# Patient Record
Sex: MALE | Race: BLACK OR AFRICAN AMERICAN | Employment: FULL TIME | URBAN - METROPOLITAN AREA
[De-identification: names, ages, dates, MRNs, and addresses within clinical notes are randomized per-mention and may not be internally consistent; named-entity substitution may affect disease eponyms.]

---

## 2017-06-30 ENCOUNTER — HOSPITAL ENCOUNTER (OUTPATIENT)
Dept: GENERAL RADIOLOGY | Age: 62
Discharge: HOME | End: 2017-06-30
Payer: COMMERCIAL

## 2017-06-30 DIAGNOSIS — R20.0 NUMBNESS AND TINGLING OF HAND: ICD-10-CM

## 2017-06-30 DIAGNOSIS — R20.2 NUMBNESS AND TINGLING OF HAND: ICD-10-CM

## 2017-06-30 PROCEDURE — 72040 X-RAY EXAM NECK SPINE 2-3 VW: CPT

## 2017-08-17 ENCOUNTER — HOSPITAL ENCOUNTER (OUTPATIENT)
Dept: PHYSICAL THERAPY | Age: 62
Discharge: HOME | End: 2017-08-17
Payer: COMMERCIAL

## 2017-08-17 PROCEDURE — 97110 THERAPEUTIC EXERCISES: CPT

## 2017-08-17 PROCEDURE — 97140 MANUAL THERAPY 1/> REGIONS: CPT

## 2017-08-17 PROCEDURE — 97161 PT EVAL LOW COMPLEX 20 MIN: CPT

## 2017-08-17 NOTE — PROGRESS NOTES
PT DAILY TREATMENT NOTE     Patient Name: Alex Grimes  Date:2017  : 1955  [x]  Patient  Verified  Payor: Ava Granado / Plan: [employer] / Product Type: [employer] /    In time:900  Out time:943  Total Treatment Time (min): 43  Visit #: 1 of     Treatment Area: Neck pain [M54.2]    SUBJECTIVE  Pain Level (0-10 scale): 0/10  Any medication changes, allergies to medications, adverse drug reactions, diagnosis change, or new procedure performed?: [x] No    [] Yes (see summary sheet for update)  Subjective functional status/changes:   [] No changes reported  See eval    OBJECTIVE    25 min []Eval                  []Re-Eval       8 min Therapeutic Exercise:  [] See flow sheet :   Rationale: increase ROM and increase strength to improve the patients ability to ease with ADL's    10 min Manual Therapy:  SOR   Rationale: decrease pain, increase ROM and decrease trigger points to ease with ADL's        With   [] TE   [] TA   [] neuro   [] other: Patient Education: [x] Review HEP    [] Progressed/Changed HEP based on:   [] positioning   [] body mechanics   [] transfers   [] heat/ice application    [] other:      Other Objective/Functional Measures: see eval     Pain Level (0-10 scale) post treatment: 0/10    ASSESSMENT/Changes in Function: see poc    Patient will continue to benefit from skilled PT services to address ROM deficits, address strength deficits, analyze and address soft tissue restrictions, analyze and cue movement patterns and analyze and modify body mechanics/ergonomics to attain remaining goals.      [x]  See Plan of Care  []  See progress note/recertification  []  See Discharge Summary         Progress towards goals / Updated goals:  See POc    PLAN  [x]  Upgrade activities as tolerated     [x]  Continue plan of care  []  Update interventions per flow sheet       []  Discharge due to:_  []  Other:_      Jacky Rojas, PT 2017  2:29 PM    Future Appointments  Date Time Provider Quang Lacey   8/22/2017 10:30 AM Fidelia Toney, PTA [id number] [facility]   8/24/2017 10:30 AM Fidelia Toney, PTA [id number] [facility]   8/29/2017 10:30 AM Fidelia Toney, PTA [id number] [facility]   8/31/2017 11:00 AM Gabi Burgess, PT [id number] [facility]   9/5/2017 9:00 AM Govea Smoker, PT [id number] [facility]   9/7/2017 10:30 AM Fidelia Toney, PTA [id number] [facility]   9/12/2017 11:00 AM Govea Smoker, PT [id number] [facility]   9/14/2017 10:30 AM Gabi Burgess, PT [id number] [facility]

## 2017-08-17 NOTE — PROGRESS NOTES
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 The Plan of Care and following information is based on the information from the initial evaluation. Assessment/ key information: Pt is a 59 yr old male with complaints of numbness to all digits distally to BUE. He reports it began 1 month ago after trimming hedges. The sx of numbness are constant and increases with C/S extension. C6-C7 MMT is grossly 4/5. C/S ROM is WNL and he denies any pain. There is some UT tightness and Cervical flexor weakness. UE ROM is WNL in all planes. C/S distraction test is positive. Sx did not decrease in supine position with manual traction today. Pt was requested to hold off on aggravating factors such as bike riding in forward flexred with C/S extension position. Pt will benefit from trial of PT to decrease sx of numbness and to improve his level of function.     Evaluation Complexity History MEDIUM  Complexity : 1-2 comorbidities / personal factors will impact the outcome/ POC ; Examination LOW Complexity : 1-2 Standardized tests and measures addressing body structure, function, activity limitation and / or participation in recreation  ;Presentation LOW Complexity : Stable, uncomplicated  ;Clinical Decision Making MEDIUM Complexity : FOTO score of 26-74  Overall Complexity Rating: LOW   Problem List: decrease ADL/ functional abilitiies, decrease activity tolerance and decrease flexibility/ joint mobility   Treatment Plan may include any combination of the following: Therapeutic exercise, Therapeutic activities, Neuromuscular re-education, Physical agent/modality, Manual therapy, Patient education and Self Care training  Patient / Family readiness to learn indicated by: asking questions, trying to perform skills and interest  Persons(s) to be included in education: patient (P)  Barriers to Learning/Limitations: None  Patient Goal (s): Relief of Paresthesias  Patient Self Reported Health Status: excellent  Rehabilitation Potential: good    Short Term Goals: To be accomplished in 1 weeks:   1. PT will be compliant with HEP to decrease sx of paresthesias. Long Term Goals: To be accomplished in 5 weeks:   1. Pt will increase FOTO score by 7 pts to ease with ADL's.   2. 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 3. Pt will demonstrate improvement and continue to be pain free during job and ADL's to improve QOL and return to PLOF. Frequency / Duration: Patient to be seen 2 times per week for 6 weeks. Patient/ CarPatient/ Caregiver education and instruction: Diagnosis, prognosis, exercises   [x]  Plan of care has been reviewed with PTA    Asha Goldberg, PT 8/17/2017 2:32 PM    ________________________________________________________________________    I certify that the above Therapy Services are being furnished while the patient is under my care. I agree with the treatment plan and certify that this therapy is necessary.     Physician's Signature:____________________  Date:____________Time: _________    Please sign and return to [facility]  [address] [facility]  [phone] fax

## 2017-08-22 ENCOUNTER — HOSPITAL ENCOUNTER (OUTPATIENT)
Dept: PHYSICAL THERAPY | Age: 62
Discharge: HOME | End: 2017-08-22
Payer: COMMERCIAL

## 2017-08-22 PROCEDURE — 97110 THERAPEUTIC EXERCISES: CPT

## 2017-08-22 PROCEDURE — 97140 MANUAL THERAPY 1/> REGIONS: CPT

## 2017-08-22 NOTE — PROGRESS NOTES
PT DAILY TREATMENT NOTE     Patient Name: Mauro Lovell  Date:2017  : 1955  [x]  Patient  Verified  Payor: [employer] / Plan: [employer] / Product Type: [employer] /    In time: 10:30  Out time:11:15  Total Treatment Time (min): 45  Visit #: 2 of -12    Treatment Area: Neck pain [M54.2]    SUBJECTIVE  Pain Level (0-10 scale): 0/10  Any medication changes, allergies to medications, adverse drug reactions, diagnosis change, or new procedure performed?: [x] No    [] Yes (see summary sheet for update)  Subjective functional status/changes:   [] No changes reported  Pt reported feeling little relief, still have min numbness in his hands and fingers    OBJECTIVE    Modality rationale: decrease pain, increase tissue extensibility and increase muscle contraction/control to improve the patients ability to tolerate ADLs   Min Type Additional Details    [] Estim:  []Unatt       []IFC  []Premod                        []Other:  []w/ice   []w/heat  Position:  Location:    [] Estim: []Att    []TENS instruct  []NMES                    []Other:  []w/US   []w/ice   []w/heat  Position:  Location:    []  Traction: [] Cervical       []Lumbar                       [] Prone          []Supine                       []Intermittent   []Continuous Lbs:  [] before manual  [] after manual    []  Ultrasound: []Continuous   [] Pulsed                           []1MHz   []3MHz W/cm2:  Location:    []  Iontophoresis with dexamethasone         Location: [] Take home patch   [] In clinic   10 []  Ice     [x]  heat  []  Ice massage  []  Laser   []  Anodyne Position:seated Location: c/s    []  Laser with stim  []  Other:  Position:  Location:    []  Vasopneumatic Device Pressure:       [] lo [] med [] hi   Temperature: [] lo [] med [] hi   [x] Skin assessment post-treatment:  [x]intact []redness- no adverse reaction    []redness - adverse reaction:         27 min Therapeutic Exercise:  [x] See flow sheet :   Rationale: increase ROM and increase strength to improve the patients ability to ease with ADL's    8 min Manual Therapy:  SOR & B UT DTM/TPR   Rationale: decrease pain, increase ROM, increase tissue extensibility and decrease trigger points to perform ADLs with ease          With   [] TE   [] TA   [] neuro   [] other: Patient Education: [x] Review HEP    [] Progressed/Changed HEP based on:   [] positioning   [] body mechanics   [] transfers   [] heat/ice application    [] other:      Other Objective/Functional Measures:    Performed all Domi with no numbness or pain   Mod hypertonicity of suboccipital muscles and B UT      Pain Level (0-10 scale) post treatment: 0/10    ASSESSMENT/Changes in Function: initiated treatment as POC, didn't show sign/symptoms during PT session. Will edu him on tennis ball and theracane use to assist with trigger points/muscle tightness relief for self management in long term. Patient will continue to benefit from skilled PT services to modify and progress therapeutic interventions, address functional mobility deficits, address strength deficits, analyze and address soft tissue restrictions, analyze and cue movement patterns, analyze and modify body mechanics/ergonomics, assess and modify postural abnormalities and instruct in home and community integration to attain remaining goals. [x]  See Plan of Care  []  See progress note/recertification  []  See Discharge Summary         Progress towards goals / Updated goals:  Short Term Goals: To be accomplished in 1 weeks:                         1. PT will be compliant with HEP to decrease sx of paresthesias. Partially met due to pt's schedule 8-22-17  Long Term Goals: To be accomplished in 5 weeks:                         1. Pt will increase FOTO score by 7 pts to ease with ADL's.                         2. 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                          3. Pt will demonstrate improvement and continue to be pain free during job and ADL's to improve QOL and return to OF.     PLAN   []  Upgrade activities as tolerated     [x]  Continue plan of care  []  Update interventions per flow sheet       []  Discharge due to:_  []  Other:_      Danay Lopez, PT 8/22/2017  9:12 AM    Future Appointments  Date Time Provider Quang Lacey   8/22/2017 10:30 AM Nadiya Escobar [id number] [facility]   8/24/2017 10:30 AM Miroslava Carmona PTA [id number] [facility]   8/29/2017 10:30 AM Miroslava Carmona, BRIDGET MMCPTPB [facility]   8/31/2017 11:00 AM Jacqualine Schaumann, PT SOWREAJ [facility]   9/5/2017 9:00 AM Mell Rowland, PT [id number] [facility]   9/7/2017 10:30 AM Miroslava Carmona, PTA [id number] [facility]   9/12/2017 11:00 AM Mell Rowland, PT [id number] [facility]   9/14/2017 10:30 AM Jacqualine Schaumann, PT [id number] [facility]

## 2017-08-24 ENCOUNTER — HOSPITAL ENCOUNTER (OUTPATIENT)
Dept: PHYSICAL THERAPY | Age: 62
Discharge: HOME | End: 2017-08-24
Payer: COMMERCIAL

## 2017-08-24 PROCEDURE — 97140 MANUAL THERAPY 1/> REGIONS: CPT

## 2017-08-24 PROCEDURE — 97110 THERAPEUTIC EXERCISES: CPT

## 2017-08-24 NOTE — PROGRESS NOTES
PT DAILY TREATMENT NOTE     Patient Name: Rose Taveras  Date:2017  : 1955  [x]  Patient  Verified  Payor: [employer] / Plan: Glenn Lazo / Product Type: Local [employer] /    In time:1034  Out time:1115  Total Treatment Time (min): 41  Visit #: 3 of 8-10    Treatment Area: Neck pain [M54.2]    SUBJECTIVE  Pain Level (0-10 scale): 0/10  Any medication changes, allergies to medications, adverse drug reactions, diagnosis change, or new procedure performed?: [x] No    [] Yes (see summary sheet for update)  Subjective functional status/changes:   [] No changes reported  Reports  He had 10% relief after last session and as soon as he got on the computer at work the sx came back fully. He said he is back to 10% less symptomatic.     OBJECTIVE    Modality rationale: decrease pain to improve the patients ability to ease with ADL's   Min Type Additional Details    [] Estim:  []Unatt       []IFC  []Premod                        []Other:  []w/ice   []w/heat  Position:  Location:    [] Estim: []Att    []TENS instruct  []NMES                    []Other:  []w/US   []w/ice   []w/heat  Position:  Location:    []  Traction: [] Cervical       []Lumbar                       [] Prone          []Supine                       []Intermittent   []Continuous Lbs:  [] before manual  [] after manual    []  Ultrasound: []Continuous   [] Pulsed                           []1MHz   []3MHz W/cm2:  Location:    []  Iontophoresis with dexamethasone         Location: [] Take home patch   [] In clinic   10 []  Ice     [x]  heat  []  Ice massage  []  Laser   []  Anodyne Position:supine  Location:c/    []  Laser with stim  []  Other:  Position:  Location:    []  Vasopneumatic Device Pressure:       [] lo [] med [] hi   Temperature: [] lo [] med [] hi   [] Skin assessment post-treatment:  []intact []redness- no adverse reaction    []redness - adverse reaction:       19 min Therapeutic Exercise:  [] See flow sheet :   Rationale: increase ROM and increase strength to improve the patients ability to ease with ADL    12 min Manual Therapy:  1st rib mobs, SOR, UT TPR. Rationale: decrease pain, increase ROM and decrease trigger points to ease with ADL's       With   [] TE   [] TA   [] neuro   [] other: Patient Education: [x] Review HEP    [] Progressed/Changed HEP based on:   [] positioning   [] body mechanics   [] transfers   [] heat/ice application    [] other:      Other Objective/Functional Measures: Right UT trigger points. Good tolerance to manual therapy and nerve glides. Pt reports mild relief so far. Pain Level (0-10 scale) post treatment: 0/10    ASSESSMENT/Changes in Function: Progressing well so far. Pt is compliant with ex's throughout th day. He tries to stay active an    Patient will continue to benefit from skilled PT services to modify and progress therapeutic interventions, address functional mobility deficits, address ROM deficits, address strength deficits, analyze and address soft tissue restrictions, analyze and cue movement patterns, analyze and modify body mechanics/ergonomics, assess and modify postural abnormalities and address imbalance/dizziness to attain remaining goals. [x]  See Plan of Care  []  See progress note/recertification  []  See Discharge Summary         Progress towards goals / Updated goals:  Short Term Goals: To be accomplished in 1 weeks:                         3. PT will be compliant with HEP to decrease sx of paresthesias. Partially met due to pt's schedule 8-22-17  Long Term Goals: To be accomplished in 5 weeks:                         1. Pt will increase FOTO score by 7 pts to ease with ADL's.                         8. 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 Pt will demonstrate improvement and continue to be pain free during job and ADL's to improve QOL and return to PLOF.     PLAN  [] Upgrade activities as tolerated     [x]  Continue plan of care  []  Update interventions per flow sheet       []  Discharge due to:_  []  Other:_      Nila Martinez, PT 8/24/2017  10:45 AM    Future Appointments  Date Time Provider Quang Lacey   8/29/2017 10:30 AM Niko Tate PTA [id number] [facility]   8/31/2017 11:00 AM Fredrick Crenshaw, PT [id number] [facility]   9/5/2017 9:00 AM Nila Martinez, PT [id number] [facility]   9/7/2017 10:30 AM Niko Tate, PTA [id number] [facility]   9/12/2017 11:00 AM Nila Martinez, PT [id number] [facility]   9/14/2017 10:30 AM Fredrick Crenshaw, PT [id number] [facility]

## 2017-08-29 ENCOUNTER — HOSPITAL ENCOUNTER (OUTPATIENT)
Dept: PHYSICAL THERAPY | Age: 62
Discharge: HOME | End: 2017-08-29
Payer: COMMERCIAL

## 2017-08-29 PROCEDURE — 97110 THERAPEUTIC EXERCISES: CPT

## 2017-08-29 PROCEDURE — 97140 MANUAL THERAPY 1/> REGIONS: CPT

## 2017-08-29 NOTE — PROGRESS NOTES
PT DAILY TREATMENT NOTE     Patient Name: Boubacar Mccray  Date:2017  : 1955  [x]  Patient  Verified  Payor: Reyes Daunt / Plan: [employer] / Product Type: [employer] /    In time: 10:29 Out time:11:19  Total Treatment Time (min): 50  Visit #: 4 of 8-10    Treatment Area: Neck pain [M54.2]    SUBJECTIVE  Pain Level (0-10 scale): 0/10  Any medication changes, allergies to medications, adverse drug reactions, diagnosis change, or new procedure performed?: [x] No    [] Yes (see summary sheet for update)  Subjective functional status/changes:   [] No changes reported  Pt reports no pain just min/mod pins/needles feelings in his fingers L>R. Pt states doing his exercises at home. He has most discomfort and feelings in the hands upon waking and after working on the computer. He gets some relief sleeping on his back.      OBJECTIVE      35 min Therapeutic Exercise:  [x] See flow sheet :   Rationale: increase ROM, increase strength, improve coordination, improve balance and increase proprioception to improve the patients ability to sleep and work on the computer    15 min Manual Therapy:  Manual traction in flexion; SOR   Rationale: decrease pain, increase ROM and increase tissue extensibility to centralize symptoms for ease of working on the computer       With   [] TE   [] TA   [] neuro   [] other: Patient Education: [x] Review HEP    [] Progressed/Changed HEP based on:   [] positioning   [] body mechanics   [] transfers   [] heat/ice application    [] other:      Other Objective/Functional Measures:   No change in symptoms with SOR or flexion  Slight decrease in symptoms in L hand with c/s retractions  Good form with nerve glides after review  Educated on side sleeping posture with pillow positioning  Educated on c/s retractions when working on the computer for posture  Educated on tennis ball use for SOR     Pain Level (0-10 scale) post treatment: 0/10    ASSESSMENT/Changes in Function: Pt making slow progress towards goals in regards to centralizing symptoms. Pt still with min-mod parathesias in the L>R hand. Pt has most discomfort when working on the computer and awaking in the morning suggesting posture has some contribution. Will continue working on posture and ask MD regarding trial of traction for decompression to the lower c/s for centralization of symptoms. Patient will continue to benefit from skilled PT services to modify and progress therapeutic interventions, address functional mobility deficits, address ROM deficits, address strength deficits, analyze and address soft tissue restrictions, analyze and cue movement patterns, analyze and modify body mechanics/ergonomics, assess and modify postural abnormalities, address imbalance/dizziness and instruct in home and community integration to attain remaining goals. Progress towards goals / Updated goals:  Short Term Goals: To be accomplished in 1 weeks:  1. PT will be compliant with HEP to decrease sx of paresthesias. Partially met due to pt's schedule 8-22-17  Long Term Goals: To be accomplished in 5 weeks:  1. Pt will increase FOTO score by 7 pts to ease with ADL's.  2. 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 Pt will demonstrate improvement and continue to be pain free during job and ADL's to improve QOL and return to PLOF.     PLAN  [x]  Upgrade activities as tolerated     [x]  Continue plan of care  []  Update interventions per flow sheet       []  Discharge due to:_  []  Other:_      Sagrario Flores PTA 8/29/2017  10:01 AM    Future Appointments  Date Time Provider Quang Lacey   8/29/2017 10:30 AM Sagrario Flores PTA [id number] [facility]   8/31/2017 11:00 AM Srinivasan Gooden, PT [id number] [facility]   9/5/2017 9:00 AM Anna Escalante, PT [id number] [facility]   9/7/2017 10:30 AM Sagrario Flores PTA [id number] [facility]   9/12/2017 11:00 AM Anna Escalante PT [id number] [facility]   9/14/2017 10:30 AM Alexis Found, PT [id number] [facility]

## 2017-08-31 ENCOUNTER — HOSPITAL ENCOUNTER (OUTPATIENT)
Dept: PHYSICAL THERAPY | Age: 62
Discharge: HOME | End: 2017-08-31
Payer: COMMERCIAL

## 2017-08-31 PROCEDURE — 97140 MANUAL THERAPY 1/> REGIONS: CPT

## 2017-08-31 PROCEDURE — 97110 THERAPEUTIC EXERCISES: CPT

## 2017-08-31 NOTE — PROGRESS NOTES
PT DAILY TREATMENT NOTE - [facility] 3-16    Patient Name: Dangelo Yates  Date:2017  : 1955  [x]  Patient  Verified  Payor: Jamal Salts / Plan: [employer] / Product Type: Local Market /    In time: 11:00  Out time: 11:43  Total Treatment Time (min): 43  Total Timed Codes (min): 33     Visit #: 5 of 8-10    Treatment Area: Neck pain [M54.2]    SUBJECTIVE  Pain Level (0-10 scale):  0/10  Any medication changes, allergies to medications, adverse drug reactions, diagnosis change, or new procedure performed?: [x] No    [] Yes (see summary sheet for update)  Subjective functional status/changes:   [] No changes reported  Pt. Reports he is feeling about the same. He continues to have symptoms down to his finger tips.      OBJECTIVE    Modality rationale: decrease pain and increase tissue extensibility to improve the patients ability to increase ease of ADLs   Min Type Additional Details    [] Estim:  []Unatt       []IFC  []Premod                        []Other:  []w/ice   []w/heat  Position:  Location:    [] Estim: []Att    []TENS instruct  []NMES                    []Other:  []w/US   []w/ice   []w/heat  Position:  Location:    []  Traction: [] Cervical       []Lumbar                       [] Prone          []Supine                       []Intermittent   []Continuous Lbs:  [] before manual  [] after manual    []  Ultrasound: []Continuous   [] Pulsed                           []1MHz   []3MHz Location:  W/cm2:    []  Iontophoresis with dexamethasone         Location: [] Take home patch   [] In clinic   10 []  Ice     [x]  heat  []  Ice massage  []  Laser   []  Anodyne Position: seated  Location: cervical    []  Laser with stim  []  Other: Position:  Location:    []  Vasopneumatic Device Pressure:       [] lo [] med [] hi   Temperature: [] lo [] med [] hi   [x] Skin assessment post-treatment:  [x]intact []redness- no adverse reaction    []redness - adverse reaction:     18 min Therapeutic Exercise:  [x] See flow sheet :   Rationale: increase ROM and increase strength to improve the patients ability to increase ease of ADLs    15 min Manual Therapy:  Manual traction, SOR, neural tension glides   Rationale: decrease pain, increase ROM and increase tissue extensibility to increase ease of ADLs          With   [x] TE   [] TA   [] neuro   [] other: Patient Education: [x] Review HEP    [] Progressed/Changed HEP based on:   [] positioning   [] body mechanics   [] transfers   [] heat/ice application    [] other:      Other Objective/Functional Measures:   Negative distraction test, positive Spurling test to R, negative Sharp-lexx test, negative median and ulnar nerve glides  Pt. Has some relief of rotation to L head nods and was educated on performing for HEP   No significant trigger points noted     Pain Level (0-10 scale) post treatment: 0/10    ASSESSMENT/Changes in Function:  Pt. Is making limited progress towards goals. He continues to have significant numbness/tingling down to his finger tips    Patient will continue to benefit from skilled PT services to modify and progress therapeutic interventions, address functional mobility deficits, address ROM deficits, address strength deficits, analyze and address soft tissue restrictions, analyze and cue movement patterns, analyze and modify body mechanics/ergonomics and assess and modify postural abnormalities to attain remaining goals. []  See Plan of Care  []  See progress note/recertification  []  See Discharge Summary         Progress towards goals / Updated goals:  Short Term Goals: To be accomplished in 1 weeks:  1. PT will be compliant with HEP to decrease sx of paresthesias. Partially met due to pt's schedule 8-22-17  Long Term Goals: To be accomplished in 5 weeks:  1. Pt will increase FOTO score by 7 pts to ease with ADL's.  2. 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  Pt will demonstrate improvement and continue to be pain free during job and ADL's to improve QOL and return to [facility]    PLAN  []  Upgrade activities as tolerated     [x]  Continue plan of care  []  Update interventions per flow sheet       []  Discharge due to:_  []  Other:_      Lexx Gabriel, PT 8/31/2017  11:03 AM

## 2017-09-05 ENCOUNTER — HOSPITAL ENCOUNTER (OUTPATIENT)
Dept: PHYSICAL THERAPY | Age: 62
Discharge: HOME | End: 2017-09-05
Payer: COMMERCIAL

## 2017-09-05 PROCEDURE — 97110 THERAPEUTIC EXERCISES: CPT

## 2017-09-05 PROCEDURE — 97140 MANUAL THERAPY 1/> REGIONS: CPT

## 2017-09-05 NOTE — PROGRESS NOTES
PT DAILY TREATMENT NOTE     Patient Name: Oswaldo Cardona  Date:2017  : 1955  [x]  Patient  Verified  Payor: Quinton Staley / Plan: [employer] [place] / Product Type: Local Market /    In time:904  Out time:934  Total Treatment Time (min): 30  Visit #: 6 of 8    Treatment Area: Neck pain [M54.2]    SUBJECTIVE  Pain Level (0-10 scale): 0/10  Any medication changes, allergies to medications, adverse drug reactions, diagnosis change, or new procedure performed?: [x] No    [] Yes (see summary sheet for update)  Subjective functional status/changes:   [] No changes reported  Pt reports sx are the same and maybe a little better, but not worst.    OBJECTIVE    20 min Therapeutic Exercise:  [] See flow sheet :   Rationale: increase ROM and increase strength to improve the patients ability to ease with ADL's    10 min Manual Therapy:  SOR, median glides, manual traction. Rationale: decrease pain, increase ROM, increase tissue extensibility and decrease trigger points to ease with ADL's            With   [] TE   [] TA   [] neuro   [] other: Patient Education: [x] Review HEP    [] Progressed/Changed HEP based on:   [] positioning   [] body mechanics   [] transfers   [] heat/ice application    [] other:      Other Objective/Functional Measures: Added SB and increased traction to c/s  UE in supine. Median glides in supine. Pain Level (0-10 scale) post treatment: 0/10    ASSESSMENT/Changes in Function: Slow progress In therapy. Pt reports he is not worst but maybe 20% improved so far. Pt was informed from MD that Mechanical Traction was not allowed. Will continue with current treatment.     Patient will continue to benefit from skilled PT services to modify and progress therapeutic interventions, address functional mobility deficits, address ROM deficits, address strength deficits, analyze and address soft tissue restrictions, analyze and cue movement patterns, analyze and modify body mechanics/ergonomics and assess and modify postural abnormalities to attain remaining goals. [x]  See Plan of Care  []  See progress note/recertification  []  See Discharge Summary         Progress towards goals / Updated goals:    Short Term Goals: To be accomplished in 1 weeks:  1. PT will be compliant with HEP to decrease sx of paresthesias. Partially met due to pt's schedule 8-22-17  Long Term Goals: To be accomplished in 5 weeks:  1. Pt will increase FOTO score by 7 pts to ease with ADL's.  2. 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 Pt reports 20% improvement.   3. Pt will demonstrate improvement and continue to be pain free during job and ADL's to improve QOL and return to PLOF  PLAN  [x]  Upgrade activities as tolerated     [x]  Continue plan of care  []  Update interventions per flow sheet       []  Discharge due to:_  []  Other:_      George Boxer, PT 9/5/2017  9:32 AM    Future Appointments  Date Time Provider Quang Lacey   9/7/2017 10:30 AM Ingrid Glaser, PTA [id number]   9/12/2017 11:00 AM George Boxer, PT AG [facility]   9/14/2017 10:30 AM Jocelyn Lee, PT [id number]

## 2017-09-07 ENCOUNTER — HOSPITAL ENCOUNTER (OUTPATIENT)
Dept: PHYSICAL THERAPY | Age: 62
Discharge: HOME | End: 2017-09-07
Payer: COMMERCIAL

## 2017-09-07 PROCEDURE — 97140 MANUAL THERAPY 1/> REGIONS: CPT

## 2017-09-07 PROCEDURE — 97110 THERAPEUTIC EXERCISES: CPT

## 2017-09-07 NOTE — PROGRESS NOTES
PT DAILY TREATMENT NOTE     Patient Name: Anusha Lee  Date:2017  : 1955  [x]  Patient  Verified  Payor: Emily Hernandez / Plan: [employer] Other / Product Type: Local Market /    In time:10:30  Out time: 11:10  Total Treatment Time (min): 40  Visit #: 7 of 8    Treatment Area: Neck pain [M54.2]    SUBJECTIVE  Pain Level (0-10 scale): 0/10  Any medication changes, allergies to medications, adverse drug reactions, diagnosis change, or new procedure performed?: [x] No    [] Yes (see summary sheet for update)  Subjective functional status/changes:   [] No changes reported  Pt reports 30% improvement in symptoms. He notes he is working on his posture at the computer at work when he remembers or starts to get sensation in his hands. Pt also notices increase in symptoms when looking up to get items out of a cabinet.      OBJECTIVE    10 minutes heat to c/s in supine at end of session for decreased muscle tension for ease of working on the computer    20 min Therapeutic Exercise:  [x] See flow sheet :   Rationale: increase ROM, increase strength, improve coordination, improve balance and increase proprioception to improve the patients ability to improve ease of working on the computer    10 min Manual Therapy:  Manual traction; manual UT/levator stretching; PA glides and side glides to C3-C4 and C6-C7   Rationale: decrease pain, increase ROM and increase tissue extensibility to improve ease of working on the computer with centralized symptoms          With   [] TE   [] TA   [] neuro   [] other: Patient Education: [x] Review HEP    [] Progressed/Changed HEP based on:   [] positioning   [] body mechanics   [] transfers   [] heat/ice application    [] other:      Other Objective/Functional Measures:   No change in symptoms during session  No increase in pain during session  Provided picture of open book, c/s retraction and c/s DNF nods for HEP    Pain Level (0-10 scale) post treatment: 0/10    ASSESSMENT/Changes in Function: Pt making slow progress with 30% improvement since start of care. Pt still getting radicular symptoms in B hands especially when looking into cabinets and working on the computer. Pt has good compliance of HEP and is working on his posture when working. Will continue with nerve flossing and postural strengthening for centralization of symptoms and ease of performing work related duties. Patient will continue to benefit from skilled PT services to modify and progress therapeutic interventions, address functional mobility deficits, address ROM deficits, address strength deficits, analyze and address soft tissue restrictions, analyze and cue movement patterns, analyze and modify body mechanics/ergonomics, assess and modify postural abnormalities, address imbalance/dizziness and instruct in home and community integration to attain remaining goals. Progress towards goals / Updated goals:  Short Term Goals: To be accomplished in 1 weeks:  1. PT will be compliant with HEP to decrease sx of paresthesias. Partially met due to pt's schedule 8-22-17  Long Term Goals: To be accomplished in 5 weeks:  1. Pt will increase FOTO score by 7 pts to ease with ADL's.  2. 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 Pt reports 20% improvement.   3. Pt will demonstrate improvement and continue to be pain free during job and ADL's to improve QOL and return to PLOF    PLAN  [x]  Upgrade activities as tolerated     [x]  Continue plan of care  []  Update interventions per flow sheet       []  Discharge due to:_  []  Other:_      Belen Mcarthur, PTA 9/7/2017  10:42 AM    Future Appointments  Date Time Provider Quang Lacey   9/12/2017 11:00 AM Naeem Kellogg, PT [facility]   9/14/2017 10:30 AM Krys Abbasi, PT [id number]

## 2017-09-12 ENCOUNTER — APPOINTMENT (OUTPATIENT)
Dept: PHYSICAL THERAPY | Age: 62
End: 2017-09-12
Payer: COMMERCIAL

## 2017-09-14 ENCOUNTER — HOSPITAL ENCOUNTER (OUTPATIENT)
Dept: PHYSICAL THERAPY | Age: 62
Discharge: HOME | End: 2017-09-14
Payer: COMMERCIAL

## 2017-09-14 PROCEDURE — 97110 THERAPEUTIC EXERCISES: CPT

## 2017-09-14 PROCEDURE — 97140 MANUAL THERAPY 1/> REGIONS: CPT

## 2017-09-14 NOTE — PROGRESS NOTES
PT DAILY TREATMENT NOTE - [facility] 316    Patient Name: Kiya Gold  Date:2017  : 1955  [x]  Patient  Verified  Payor: Barbee Mcardle / Plan: [employer] / Product Type: Local Market /    In time: 10:30  Out time: 11:11  Total Treatment Time (min): 41  Total Timed Codes (min): 31     Visit #: 8 of 8    Treatment Area: Neck pain [M54.2]    SUBJECTIVE  Pain Level (0-10 scale):  0/10  Any medication changes, allergies to medications, adverse drug reactions, diagnosis change, or new procedure performed?: [x] No    [] Yes (see summary sheet for update)  Subjective functional status/changes:   [] No changes reported  Pt. Reports he continues to have numbness in B fingers but it has improved some. Pt. Continues to have concerns about performing work duties because of his numbness.      OBJECTIVE    Modality rationale: decrease pain and increase tissue extensibility to improve the patients ability to increase ease of ADLs   Min Type Additional Details    [] Estim:  []Unatt       []IFC  []Premod                        []Other:  []w/ice   []w/heat  Position:  Location:    [] Estim: []Att    []TENS instruct  []NMES                    []Other:  []w/US   []w/ice   []w/heat  Position:  Location:    []  Traction: [] Cervical       []Lumbar                       [] Prone          []Supine                       []Intermittent   []Continuous Lbs:  [] before manual  [] after manual    []  Ultrasound: []Continuous   [] Pulsed                           []1MHz   []3MHz Location:  W/cm2:    []  Iontophoresis with dexamethasone         Location: [] Take home patch   [] In clinic   10 []  Ice     [x]  heat  []  Ice massage  []  Laser   []  Anodyne Position: seated  Location: neck    []  Laser with stim  []  Other: Position:  Location:    []  Vasopneumatic Device Pressure:       [] lo [] med [] hi   Temperature: [] lo [] med [] hi   [x] Skin assessment post-treatment:  [x]intact []redness- no adverse reaction    []redness - adverse reaction:     21 min Therapeutic Exercise:  [x] See flow sheet :   Rationale: increase ROM and increase strength to improve the patients ability to increase ease of ADLs    10 min Manual Therapy:  Cervical distraction, manual median and ulnar nerve glides   Rationale: decrease pain, increase ROM and increase tissue extensibility to increase ease of working          With   [x] TE   [] TA   [] neuro   [] other: Patient Education: [x] Review HEP    [] Progressed/Changed HEP based on:   [] positioning   [] body mechanics   [] transfers   [] heat/ice application    [] other:      Other Objective/Functional Measures:   Pt. Report 30% improvement in symptoms since [facility]  FOTO score 69 points  He continues to have numbness into fingers on both sides.       Pain Level (0-10 scale) post treatment: 0/10    ASSESSMENT/Changes in Function:     See progress note          Progress towards goals / Updated goals:  See progress note    PLAN  []  Upgrade activities as tolerated     [x]  Continue plan of care  []  Update interventions per flow sheet       []  Discharge due to:_  []  Other:_      Destinee Gaitan, PT 9/14/2017  10:37 AM

## 2017-09-14 NOTE — PROGRESS NOTES
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 Is progressing slowly towards goals. He continues to have numbness going into fingers on bother hands but he reports a 30% improvement in symptoms so far. He has some relief with manual cervical distraction. FOTO score digressed to 69 points. He continues to have negative neural tension testing and good cervical mobility. Skilled PT is medically necessary in order to improve B hand numbness for increased ease of working and return to PLOF. Updated Goals: to be achieved in 4 weeks:  1. Patient will improve FOTO score by 7 points in order to demonstrate a significant improvement in function. 2. Patient will report a 50% improvement in symptoms since [facility] in order to increase ease of working. 3. Patient will demonstrate understanding of updated HEP in order to continue to improve following D/C.     ASSESSMENT/RECOMMENDATIONS:  [x]Continue therapy per initial plan/protocol at a frequency of  2 x per week for 4 weeks  []Continue therapy with the following recommended changes:_____________________ _____________________________________________________________________  []Discontinue therapy progressing towards or have reached established goals  []Discontinue therapy due to lack of appreciable progress towards goals  []Discontinue therapy due to lack of attendance or compliance  []Await Physician's recommendations/decisions regarding therapy  []Other:________________________________________________________________    Thank you for this referral.   Arnold Coffey, PT 9/14/2017 1:43 PM    NOTE TO PHYSICIAN:  [address] TO [facility] Physical Therapy: [phone]  If you are unable to process this request in 24 hours please contact our office: [phone]    ? I have read the above report and request that my patient continue as recommended. ? I have read the above report and request that my patient continue therapy with the following changes/special instructions:____________________________________  ? I have read the above report and request that my patient be discharged from therapy.     Physicians signature: ______________________________Date: ______Time:______

## 2017-09-19 ENCOUNTER — HOSPITAL ENCOUNTER (OUTPATIENT)
Dept: PHYSICAL THERAPY | Age: 62
Discharge: HOME | End: 2017-09-19
Payer: COMMERCIAL

## 2017-09-19 PROCEDURE — 97140 MANUAL THERAPY 1/> REGIONS: CPT

## 2017-09-19 PROCEDURE — 97110 THERAPEUTIC EXERCISES: CPT

## 2017-09-19 NOTE — PROGRESS NOTES
PT DAILY TREATMENT NOTE     Patient Name: Veronica November  Date:2017  : 1955  [x]  Patient  Verified  Payor: Anusha Ojeda / Plan: [employer] / Product Type: Local [employer] /    In time: 10:00  Out time: 10:51  Total Treatment Time (min): 51  Visit #: 1 of 8    Treatment Area: Neck pain [M54.2]    SUBJECTIVE  Pain Level (0-10 scale): 0/10  Any medication changes, allergies to medications, adverse drug reactions, diagnosis change, or new procedure performed?: [x] No    [] Yes (see summary sheet for update)  Subjective functional status/changes:   [] No changes reported  Pt reports no current pain but still numbness in his fingers. Pt states it feels like it is coming back again. Some days are better than others. Pt is going to call the MD regarding continued symptoms.      OBJECTIVE    Modality rationale: decrease pain and increase tissue extensibility to improve the patients ability to perform work related duties without numbness   Min Type Additional Details    [] Estim:  []Unatt       []IFC  []Premod                        []Other:  []w/ice   []w/heat  Position:  Location:    [] Estim: []Att    []TENS instruct  []NMES                    []Other:  []w/US   []w/ice   []w/heat  Position:  Location:    []  Traction: [] Cervical       []Lumbar                       [] Prone          []Supine                       []Intermittent   []Continuous Lbs:  [] before manual  [] after manual    []  Ultrasound: []Continuous   [] Pulsed                           []1MHz   []3MHz W/cm2:  Location:    []  Iontophoresis with dexamethasone         Location: [] Take home patch   [] In clinic   10 []  Ice     [x]  heat  []  Ice massage  []  Laser   []  Anodyne Position: supine  Location: c/s    []  Laser with stim  []  Other:  Position:  Location:    []  Vasopneumatic Device Pressure:       [] lo [] med [] hi   Temperature: [] lo [] med [] hi   [x] Skin assessment post-treatment:  [x]intact []redness- no adverse reaction    []redness - adverse reaction:     26 min Therapeutic Exercise:  [x] See flow sheet :   Rationale: increase ROM and increase strength to improve the patients ability to work on the computer without numbness in his hands    15 min Manual Therapy:  DTM to B UT and manual traction   Rationale: decrease pain, increase ROM and increase tissue extensibility to 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          With   [] TE   [] TA   [] neuro   [] other: Patient Education: [x] Review HEP    [] Progressed/Changed HEP based on:   [] positioning   [] body mechanics   [] transfers   [] heat/ice application    [] other:      Other Objective/Functional Measures:   No change in numbness with exercises or manual  Discussed returning to MD due to limited progress with centralization of symptoms  Cues with TB exercises to not compensate with UTs     Pain Level (0-10 scale) post treatment: 0/10    ASSESSMENT/Changes in Function: Pt is making slow progress towards goals with continued numbness L>R in the hands mainly the thumb and index fingers. Pt has had some improvement overall at 30% but has plateaued. Will suggest follow up with MD for further options and possibility of more imaging to discern the cause. Patient will continue to benefit from skilled PT services to modify and progress therapeutic interventions, address functional mobility deficits, address ROM deficits, address strength deficits, analyze and address soft tissue restrictions, analyze and cue movement patterns, analyze and modify body mechanics/ergonomics, assess and modify postural abnormalities, address imbalance/dizziness and instruct in home and community integration to attain remaining goals. Updated Goals: to be achieved in 4 weeks:  1. Patient will improve FOTO score by 7 points in order to demonstrate a significant improvement in function.    2. Patient will report a 50% improvement in symptoms since [facility] in order to increase ease of working. 3. Patient will demonstrate understanding of updated HEP in order to continue to improve following D/C.     PLAN  [x]  Upgrade activities as tolerated     [x]  Continue plan of care  []  Update interventions per flow sheet       []  Discharge due to:_  []  Other:_      Lucas Pichardo PTA 9/19/2017  10:00 AM    Future Appointments  Date Time Provider Quang Lacey   9/22/2017 10:30 AM Lucas Pichardo PTA [id number]   9/26/2017 11:00 AM Paula Jane PT [id number]   9/28/2017 10:00 AM Lucas Pichardo PTA [id number]

## 2017-09-22 ENCOUNTER — HOSPITAL ENCOUNTER (OUTPATIENT)
Dept: PHYSICAL THERAPY | Age: 62
Discharge: HOME | End: 2017-09-22
Payer: COMMERCIAL

## 2017-09-22 PROCEDURE — 97140 MANUAL THERAPY 1/> REGIONS: CPT

## 2017-09-22 PROCEDURE — 97110 THERAPEUTIC EXERCISES: CPT

## 2017-09-22 NOTE — PROGRESS NOTES
PT DAILY TREATMENT NOTE     Patient Name: Fay Costello  Date:2017  : 1955  [x]  Patient  Verified  Payor: Melvina Ruggiero / Plan: [employer] [address] [place] [address] Avenue / Product Type: [employer] /    In time:10:30  Out time: 11:28  Total Treatment Time (min): 58  Visit #: 2 of 8    Treatment Area: Neck pain [M54.2]    SUBJECTIVE  Pain Level (0-10 scale): 0/10  Any medication changes, allergies to medications, adverse drug reactions, diagnosis change, or new procedure performed?: [x] No    [] Yes (see summary sheet for update)  Subjective functional status/changes:   [] No changes reported  Pt reports no change today. He still has no pain just the numbness in his fingers. Pt states he notices it when waking up the most and at the end of the day. He has been doing his stretches and exercises at home. He goes to the MD today to see about getting a MRI.     OBJECTIVE    Modality rationale: decrease pain and increase tissue extensibility to improve the patients ability to perform work duties on the computer with centralized symptoms   Min Type Additional Details    [] Estim:  []Unatt       []IFC  []Premod                        []Other:  []w/ice   []w/heat  Position:  Location:    [] Estim: []Att    []TENS instruct  []NMES                    []Other:  []w/US   []w/ice   []w/heat  Position:  Location:    []  Traction: [] Cervical       []Lumbar                       [] Prone          []Supine                       []Intermittent   []Continuous Lbs:  [] before manual  [] after manual    []  Ultrasound: []Continuous   [] Pulsed                           []1MHz   []3MHz W/cm2:  Location:    []  Iontophoresis with dexamethasone         Location: [] Take home patch   [] In clinic   10 []  Ice     [x]  heat  []  Ice massage  []  Laser   []  Anodyne Position: supine  Location: c/s    []  Laser with stim  []  Other:  Position:  Location:    []  Vasopneumatic Device Pressure:       [] lo [] med [] hi   Temperature: [] lo [] med [] hi   [x] Skin assessment post-treatment:  [x]intact []redness- no adverse reaction    []redness - adverse reaction:     33 min Therapeutic Exercise:  [x] See flow sheet :   Rationale: increase ROM and increase strength to improve the patients ability to work on the computer without symptoms    15 min Manual Therapy:  Manual traction, manual levator and UT stretch, PA mobs to C6 and C7 and side glides   Rationale: decrease pain, increase ROM and increase tissue extensibility to centralize symptoms for ease of working on the computer          With   [] TE   [] TA   [] neuro   [] other: Patient Education: [x] Review HEP    [] Progressed/Changed HEP based on:   [] positioning   [] body mechanics   [] transfers   [] heat/ice application    [] other:      Other Objective/Functional Measures:   Challenged/fatigued with DNF nod with lift  Progressed to 3 way c/s retractions with band  No reduction of radicular symptoms with manual or exercises  No increased pain with exercises  UT hiking with TB exercises with cues at reduction  Challenged with ball stabilization on the wall     Pain Level (0-10 scale) post treatment: 0/10    ASSESSMENT/Changes in Function: Pt making slow progress towards goals and is following up with MD for more imaging. He continues to have numbness in his index and thumb bilaterally. Pt has most symptoms first thing in the morning likely due to sleeping posture and at the end of the day likely due to overuse of the UT with fatigue while typing on the computer at work.  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     Patient will continue to benefit from skilled PT services to modify and progress therapeutic interventions, address functional mobility deficits, address ROM deficits, address strength deficits, analyze and address soft tissue restrictions, analyze and cue movement patterns, analyze and modify body mechanics/ergonomics, assess and modify postural abnormalities, address imbalance/dizziness and instruct in home and community integration to attain remaining goals.            Updated Goals: to be achieved in 4 weeks:  1. Patient will improve FOTO score by 7 points in order to demonstrate a significant improvement in function. 2. Patient will report a 50% improvement in symptoms since [facility] in order to increase ease of working. 3. Patient will demonstrate understanding of updated HEP in order to continue to improve following D/C.     PLAN  [x]  Upgrade activities as tolerated     [x]  Continue plan of care  []  Update interventions per flow sheet       []  Discharge due to:_  []  Other:_      Rogelio Eldridge, PTA 9/22/2017  10:00 AM    Future Appointments  Date Time Provider Quang Lacey   9/22/2017 10:30 AM Rogelio Eldridge PTA [id number]   9/26/2017 11:00 AM Luis Tyler, PT [facility]   9/28/2017 10:00 AM Rogelio Eldridge PTA [id number]

## 2017-09-26 ENCOUNTER — APPOINTMENT (OUTPATIENT)
Dept: PHYSICAL THERAPY | Age: 62
End: 2017-09-26
Payer: COMMERCIAL

## 2017-09-28 ENCOUNTER — APPOINTMENT (OUTPATIENT)
Dept: PHYSICAL THERAPY | Age: 62
End: 2017-09-28
Payer: COMMERCIAL

## 2017-10-05 ENCOUNTER — HOSPITAL ENCOUNTER (OUTPATIENT)
Dept: MRI IMAGING | Age: 62
Discharge: HOME | End: 2017-10-05
Attending: FAMILY MEDICINE
Payer: COMMERCIAL

## 2017-10-05 DIAGNOSIS — R20.0 NUMBNESS AND TINGLING: ICD-10-CM

## 2017-10-05 DIAGNOSIS — M54.2 CERVICALGIA: ICD-10-CM

## 2017-10-05 DIAGNOSIS — R20.2 NUMBNESS AND TINGLING: ICD-10-CM

## 2017-10-05 PROCEDURE — 72141 MRI NECK SPINE W/O DYE: CPT

## 2018-01-25 ENCOUNTER — HOSPITAL ENCOUNTER (OUTPATIENT)
Dept: GENERAL RADIOLOGY | Age: 63
Discharge: HOME | End: 2018-01-25
Payer: COMMERCIAL

## 2018-01-25 DIAGNOSIS — Z01.818 PREOP EXAMINATION: ICD-10-CM

## 2018-01-25 PROCEDURE — 71046 X-RAY EXAM CHEST 2 VIEWS: CPT

## 2018-08-23 ENCOUNTER — HOSPITAL ENCOUNTER (OUTPATIENT)
Dept: LAB | Age: 63
Discharge: HOME | End: 2018-08-23

## 2018-08-23 LAB
HBV SURFACE AB SER QL IA: POSITIVE
HBV SURFACE AB SERPL IA-ACNC: 140.65 MIU/ML
HEP BS AB COMMENT,HBSAC: NORMAL
RUBV IGG SER-IMP: NORMAL
T-SPOT TB TEST (EMPLOYEE),XTBE: NORMAL

## 2018-08-23 PROCEDURE — 86735 MUMPS ANTIBODY: CPT | Performed by: EMERGENCY MEDICINE

## 2018-08-23 PROCEDURE — 86706 HEP B SURFACE ANTIBODY: CPT | Performed by: EMERGENCY MEDICINE

## 2018-08-23 PROCEDURE — 86762 RUBELLA ANTIBODY: CPT | Performed by: EMERGENCY MEDICINE

## 2018-08-23 PROCEDURE — 86765 RUBEOLA ANTIBODY: CPT | Performed by: EMERGENCY MEDICINE

## 2018-08-23 PROCEDURE — 86787 VARICELLA-ZOSTER ANTIBODY: CPT | Performed by: EMERGENCY MEDICINE

## 2018-08-23 PROCEDURE — 36415 COLL VENOUS BLD VENIPUNCTURE: CPT | Performed by: EMERGENCY MEDICINE

## 2018-08-24 LAB
MEV IGG SER IA-ACNC: >300 AU/ML
MUV IGG SER IA-ACNC: 155 AU/ML
VZV IGG SER IA-ACNC: 2007 INDEX

## 2018-08-28 NOTE — ANCILLARY DISCHARGE INSTRUCTIONS
[facility]  [address] 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 Patient will improve FOTO score by 7 points in order to demonstrate a significant improvement in function. 2. Patient will report a 50% improvement in symptoms since [facility] in order to increase ease of working. 3. Patient will demonstrate understanding of updated HEP in order to continue to improve following D/C.    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 He continues to have numbness in his index and thumb bilaterally. Pt has most symptoms first thing in the morning likely due to sleeping posture and at the end of the day likely due to overuse of the UT with fatigue while typing on the computer at work. Pt D/C'd and will follow up for further testing and re evaluation.     ASSESSMENT/RECOMMENDATIONS:  [x]Discontinue therapy: [x]Patient has reached or is progressing toward set goals      []Patient is non-compliant or has abdicated      []Due to lack of appreciable progress towards set goals    Lawrence Gavin PT 2018 9:42 PM

## 2020-08-06 ENCOUNTER — CLINICAL SUPPORT (OUTPATIENT)
Dept: FAMILY MEDICINE CLINIC | Facility: CLINIC | Age: 65
End: 2020-08-06

## 2020-08-06 DIAGNOSIS — Z20.822 CLOSE EXPOSURE TO COVID-19 VIRUS: Primary | ICD-10-CM

## 2020-08-08 LAB — SARS-COV-2, COV2: NOT DETECTED

## 2021-11-08 ENCOUNTER — TRANSCRIBE ORDER (OUTPATIENT)
Dept: REGISTRATION | Age: 66
End: 2021-11-08

## 2021-11-08 ENCOUNTER — HOSPITAL ENCOUNTER (OUTPATIENT)
Dept: GENERAL RADIOLOGY | Age: 66
Discharge: HOME | End: 2021-11-08
Payer: COMMERCIAL

## 2021-11-08 DIAGNOSIS — M25.559 PAIN IN JOINT, PELVIC REGION AND THIGH: Primary | ICD-10-CM

## 2021-11-08 DIAGNOSIS — M25.559 PAIN IN JOINT, PELVIC REGION AND THIGH: ICD-10-CM

## 2021-11-08 PROCEDURE — 73502 X-RAY EXAM HIP UNI 2-3 VIEWS: CPT
